# Patient Record
Sex: FEMALE | URBAN - METROPOLITAN AREA
[De-identification: names, ages, dates, MRNs, and addresses within clinical notes are randomized per-mention and may not be internally consistent; named-entity substitution may affect disease eponyms.]

---

## 2024-01-06 ENCOUNTER — OFFICE VISIT (OUTPATIENT)
Age: 19
End: 2024-01-06

## 2024-01-06 VITALS
HEART RATE: 126 BPM | SYSTOLIC BLOOD PRESSURE: 118 MMHG | TEMPERATURE: 99 F | DIASTOLIC BLOOD PRESSURE: 79 MMHG | WEIGHT: 200 LBS | OXYGEN SATURATION: 96 %

## 2024-01-06 DIAGNOSIS — R05.1 ACUTE COUGH: Primary | ICD-10-CM

## 2024-01-06 DIAGNOSIS — J20.9 ACUTE BRONCHITIS, UNSPECIFIED ORGANISM: ICD-10-CM

## 2024-01-06 LAB
Lab: NORMAL
QC PASS/FAIL: NORMAL
SARS-COV-2 RDRP RESP QL NAA+PROBE: NEGATIVE

## 2024-01-06 RX ORDER — PREDNISONE 20 MG/1
20 TABLET ORAL 2 TIMES DAILY
Qty: 10 TABLET | Refills: 0 | Status: SHIPPED | OUTPATIENT
Start: 2024-01-06 | End: 2024-01-11

## 2024-01-06 RX ORDER — AZITHROMYCIN 250 MG/1
250 TABLET, FILM COATED ORAL SEE ADMIN INSTRUCTIONS
Qty: 6 TABLET | Refills: 0 | Status: SHIPPED | OUTPATIENT
Start: 2024-01-06 | End: 2024-01-11

## 2024-01-06 RX ORDER — DEXTROMETHORPHAN HYDROBROMIDE AND PROMETHAZINE HYDROCHLORIDE 15; 6.25 MG/5ML; MG/5ML
5 SYRUP ORAL 4 TIMES DAILY PRN
Qty: 120 ML | Refills: 0 | Status: SHIPPED | OUTPATIENT
Start: 2024-01-06 | End: 2024-01-13

## 2024-01-06 ASSESSMENT — ENCOUNTER SYMPTOMS
ABDOMINAL PAIN: 0
CHEST TIGHTNESS: 0
HEARTBURN: 0
COUGH: 1
DIARRHEA: 0
SINUS PRESSURE: 1
VOMITING: 0
SHORTNESS OF BREATH: 0
EYE REDNESS: 0
WHEEZING: 0
SORE THROAT: 0
RHINORRHEA: 1
HEMOPTYSIS: 0

## 2024-01-06 NOTE — PROGRESS NOTES
Laurel Quinones (:  2005) is a 18 y.o. female,New patient, here for evaluation of the following chief complaint(s):  Cough (Congestion, headaches, sore throat, bodyaches, lungs on fire x 3 days)      ASSESSMENT/PLAN:  1. Acute cough    - POCT COVID-19 Rapid, NAAT  - promethazine-dextromethorphan (PROMETHAZINE-DM) 6.25-15 MG/5ML syrup; Take 5 mLs by mouth 4 times daily as needed for Cough  Dispense: 120 mL; Refill: 0    2. Acute bronchitis, unspecified organism    - azithromycin (ZITHROMAX) 250 MG tablet; Take 1 tablet by mouth See Admin Instructions for 5 days 500mg on day 1 followed by 250mg on days 2 - 5  Dispense: 6 tablet; Refill: 0  - predniSONE (DELTASONE) 20 MG tablet; Take 1 tablet by mouth 2 times daily for 5 days  Dispense: 10 tablet; Refill: 0     -pt was advised to rest and increase fluid intake,rake tylenol as needed  No follow-ups on file.    SUBJECTIVE/OBJECTIVE:  Pt c/o 3 day h/o cough and body ache,not vaccinated for covid      History provided by:  Patient  Cough  This is a new problem. The current episode started in the past 7 days. The problem has been gradually worsening. The cough is Productive of sputum. Associated symptoms include chills, myalgias, nasal congestion and rhinorrhea. Pertinent negatives include no chest pain, ear pain, eye redness, fever, headaches, heartburn, hemoptysis, rash, sore throat, shortness of breath, sweats or wheezing.       Vitals:    24 1007   BP: 118/79   Site: Right Upper Arm   Position: Sitting   Cuff Size: Large Adult   Pulse: (!) 126   Temp: 99 °F (37.2 °C)   TempSrc: Oral   SpO2: 96%   Weight: 90.7 kg (200 lb)       Review of Systems   Constitutional:  Positive for chills. Negative for activity change, appetite change and fever.   HENT:  Positive for congestion, rhinorrhea and sinus pressure. Negative for ear pain, sneezing and sore throat.    Eyes:  Negative for redness.   Respiratory:  Positive for cough. Negative for hemoptysis, chest